# Patient Record
Sex: MALE | Race: BLACK OR AFRICAN AMERICAN | NOT HISPANIC OR LATINO | Employment: FULL TIME | RURAL
[De-identification: names, ages, dates, MRNs, and addresses within clinical notes are randomized per-mention and may not be internally consistent; named-entity substitution may affect disease eponyms.]

---

## 2021-06-22 ENCOUNTER — OFFICE VISIT (OUTPATIENT)
Dept: FAMILY MEDICINE | Facility: CLINIC | Age: 21
End: 2021-06-22
Payer: MEDICAID

## 2021-06-22 VITALS
WEIGHT: 185 LBS | RESPIRATION RATE: 18 BRPM | BODY MASS INDEX: 25.06 KG/M2 | HEIGHT: 72 IN | DIASTOLIC BLOOD PRESSURE: 35 MMHG | SYSTOLIC BLOOD PRESSURE: 109 MMHG | OXYGEN SATURATION: 100 % | HEART RATE: 59 BPM

## 2021-06-22 DIAGNOSIS — J30.2 SEASONAL ALLERGIC RHINITIS, UNSPECIFIED TRIGGER: ICD-10-CM

## 2021-06-22 DIAGNOSIS — M54.50 ACUTE MIDLINE LOW BACK PAIN WITHOUT SCIATICA: Primary | ICD-10-CM

## 2021-06-22 PROCEDURE — 99213 PR OFFICE/OUTPT VISIT, EST, LEVL III, 20-29 MIN: ICD-10-PCS | Mod: ,,, | Performed by: NURSE PRACTITIONER

## 2021-06-22 PROCEDURE — 99213 OFFICE O/P EST LOW 20 MIN: CPT | Mod: ,,, | Performed by: NURSE PRACTITIONER

## 2021-06-22 RX ORDER — MONTELUKAST SODIUM 10 MG/1
10 TABLET ORAL NIGHTLY
COMMUNITY
Start: 2021-03-06 | End: 2021-06-22 | Stop reason: SDUPTHER

## 2021-06-22 RX ORDER — LORATADINE 10 MG/1
10 TABLET ORAL DAILY
Qty: 30 TABLET | Refills: 5 | Status: SHIPPED | OUTPATIENT
Start: 2021-06-22

## 2021-06-22 RX ORDER — NAPROXEN 500 MG/1
500 TABLET ORAL 2 TIMES DAILY
Qty: 20 TABLET | Refills: 1 | Status: SHIPPED | OUTPATIENT
Start: 2021-06-22

## 2021-06-22 RX ORDER — MONTELUKAST SODIUM 10 MG/1
10 TABLET ORAL NIGHTLY
Qty: 30 TABLET | Refills: 5 | Status: SHIPPED | OUTPATIENT
Start: 2021-06-22

## 2021-06-22 RX ORDER — METHOCARBAMOL 500 MG/1
500 TABLET, FILM COATED ORAL 3 TIMES DAILY
Qty: 30 TABLET | Refills: 1 | Status: SHIPPED | OUTPATIENT
Start: 2021-06-22 | End: 2021-07-02

## 2021-06-22 RX ORDER — LORATADINE 10 MG/1
10 TABLET ORAL DAILY
COMMUNITY
Start: 2021-03-06 | End: 2021-06-22 | Stop reason: SDUPTHER

## 2025-05-05 ENCOUNTER — OFFICE VISIT (OUTPATIENT)
Dept: FAMILY MEDICINE | Facility: CLINIC | Age: 25
End: 2025-05-05

## 2025-05-05 VITALS
HEIGHT: 73 IN | HEART RATE: 64 BPM | TEMPERATURE: 98 F | RESPIRATION RATE: 18 BRPM | WEIGHT: 186.81 LBS | OXYGEN SATURATION: 100 % | SYSTOLIC BLOOD PRESSURE: 134 MMHG | BODY MASS INDEX: 24.76 KG/M2 | DIASTOLIC BLOOD PRESSURE: 82 MMHG

## 2025-05-05 DIAGNOSIS — Z02.4 ENCOUNTER FOR CDL (COMMERCIAL DRIVING LICENSE) EXAM: Primary | ICD-10-CM

## 2025-05-05 NOTE — PROGRESS NOTES
ROXANNE Dang   [facility]  [address]   [place] [address]     PATIENT NAME: Los Weldon  : 2000  DATE: 25  MRN: [id number]      Billing Provider: ROXANNE Dang  Level of Service:   Patient PCP Information       Provider PCP Type    ROXANNE Dang General              Reason for Visit / Chief Complaint: No chief complaint on file.       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    ALLERGIES:  He has a known allergy to penicillin. He takes allergy medications regularly with symptoms varying seasonally, though denies any allergy breakouts this year.    SOCIAL HISTORY:  He currently smokes Black and Milds.    OCULAR:  He reports light sensitivity following eye exam, noting the environment appeared darker than usual when exiting the exam room.      ROS:  General: -fever, -chills, -fatigue, -weight gain, -weight loss  Eyes: -vision changes, -redness, -discharge, +nervousness  ENT: -ear pain, -nasal congestion, -sore throat  Cardiovascular: -chest pain, -palpitations, -lower extremity edema  Respiratory: -cough, -shortness of breath  Gastrointestinal: -abdominal pain, -nausea, -vomiting, -diarrhea, -constipation, -blood in stool  Genitourinary: -dysuria, -hematuria, -frequency  Musculoskeletal: -joint pain, -muscle pain  Skin: -rash, -lesion  Neurological: -headache, -dizziness, -numbness, -tingling  Psychiatric: -anxiety, -depression, -sleep difficulty  Allergic: +seasonal allergies           Medical / Social / Family History     Past Medical History:   Diagnosis Date    Allergy        History reviewed. No pertinent surgical history.    Social History    reports that he has never smoked. He has never used smokeless  "tobacco. He reports that he does not currently use alcohol. He reports that he does not use drugs.    Family History  's family history includes Diabetes in his maternal uncle; Hypertension in his mother.    Medications and Allergies     Medications  Outpatient Medications Marked as Taking for the 5/5/25 encounter (Office Visit) with Rahel Montes FNP   Medication Sig Dispense Refill    loratadine (CLARITIN) 10 mg tablet Take 1 tablet (10 mg total) by mouth once daily. 30 tablet 5    montelukast (SINGULAIR) 10 mg tablet Take 1 tablet (10 mg total) by mouth every evening. 30 tablet 5    naproxen (NAPROSYN) 500 MG tablet Take 1 tablet (500 mg total) by mouth 2 (two) times daily. 20 tablet 1       Allergies  Review of patient's allergies indicates:   Allergen Reactions    Penicillins        Physical Examination     Vitals:    05/05/25 0820 05/05/25 0822   BP:  134/82   Pulse:  64   Resp: (!) 0 18   Temp:  98 °F (36.7 °C)   TempSrc:  Oral   SpO2:  100%   Weight: 84.6 kg (186 lb 9.3 oz) 84.7 kg (186 lb 12.8 oz)   Height: 6' 1" (1.854 m) 6' 1" (1.854 m)        Physical Exam    General: No acute distress. Well-developed. Well-nourished.  Eyes: EOMI. Sclerae anicteric. Normal peripheral vision.  HENT: Normocephalic. Atraumatic. Nares patent. Moist oral mucosa.  Ears: Bilateral TMs clear. Bilateral EACs clear. Normal hearing in right ear. Normal hearing in left ear.  Cardiovascular: Bradycardia. Regular rhythm. No murmurs. No rubs. No gallops. Normal S1, S2.  Respiratory: Normal respiratory effort. Clear to auscultation bilaterally. No rales. No rhonchi. No wheezing.  Abdomen: Soft. Non-tender. Non-distended. Normoactive bowel sounds.  Musculoskeletal: No  obvious deformity. Normal range of motion in arms. Normal squatting ability.  Extremities: No lower extremity edema.  Neurological: Alert & oriented x3. No slurred speech. Normal gait.  Psychiatric: Normal mood. Normal affect. Good insight. Good judgment.  Skin: Warm. " "Dry. No rash.       Physical Exam     Laboratory     No results found for: "GLU", "NA", "K", "CL", "CO2", "BUN", "CREATININE", "CALCIUM", "PROT", "ALBUMIN", "BILITOT", "ALKPHOS", "AST", "ALT", "ANIONGAP", "ESTGFRAFRICA", "EGFRNONAA"    No results found for: "WBC", "RBC", "HGB", "HCT", "MCV", "RDW", "PLT"     No results found for: "CHOL", "TRIG", "HDL", "LDLCALC", "TOTALCHOLEST"    No results found for: "TSH"    No results found for: "HGBA1C", "ESTIMATEDAVG"     No results found for: "PGBCQIOE00"    No results found for: "VUMPOJVD34LI"    No results found for: "PSA"    Assessment and Plan (including Health Maintenance)     :Assessment & Plan    R00.1 Bradycardia, unspecified  J30.2 Other seasonal allergic rhinitis    F17.200 Nicotine dependence, unspecified, uncomplicated  Z88.0 Allergy status to penicillin    IMPRESSION:  - Conducted DOT exam for young, healthy patient with no reported major health issues.  - Noted patient's slow heart rate, likely due to regular exercise and good physical condition.  - Assessed vision, hearing, and range of motion, all WNL for DOT certification.    BRADYCARDIA:  - Patient has a slow heart rate, likely attributed to regular exercise.  - Will continue current exercise routine to maintain good cardiovascular health.    SEASONAL ALLERGIC RHINITIS:  - Patient is doing well with allergies this year with no breakouts.  - Educated on importance of consistent allergy medication use, recommending to start before allergy season or take year-round for better symptom control.  - Continue medication as needed.    LIGHT SENSITIVITY:  - Patient reports nervousness due to light sensitivity during eye exam but performed well in the eye exam.    NICOTINE DEPENDENCE:  - Patient acknowledges use of Black and Milds (tobacco product).  - Explained associated health risks and recommended cessation due to long-term health concerns.    PENICILLIN ALLERGY:  - Confirmed and documented patient's allergy to " penicillin in medical records to ensure avoidance.    FOLLOW-UP:  - Discussed requirement for DOT exams every 2 years.  - Patient to follow up in 2 years for next DOT exam, approximately 1 week before current certification expires.             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      Health Maintenance Due   Topic Date Due    Hepatitis C Screening  Never done    Lipid Panel  Never done    HIV Screening  Never done    HPV Vaccines (1 - Male 3-dose series) Never done    TETANUS VACCINE  Never done    COVID-19 Vaccine (1 - 2024-25 season) Never done     Health Maintenance Topics with due status: Not Due       Topic Last Completion Date    Influenza Vaccine Not Due    RSV Vaccine (Age 60+ and Pregnant patients) Not Due       Procedures     No future appointments.     No follow-ups on file.     Signature:  ROXANNE Dang    Date of encounter: 5/5/25      This note was generated with the assistance of ambient listening technology. Verbal consent was obtained by the patient and accompanying visitor(s) for the recording of patient appointment to facilitate this note. I attest to having reviewed and edited the generated note for accuracy, though some syntax or spelling errors may persist. Please contact the author of this note for any clarification.